# Patient Record
Sex: FEMALE | Race: WHITE | NOT HISPANIC OR LATINO | Employment: OTHER | URBAN - METROPOLITAN AREA
[De-identification: names, ages, dates, MRNs, and addresses within clinical notes are randomized per-mention and may not be internally consistent; named-entity substitution may affect disease eponyms.]

---

## 2017-11-28 ENCOUNTER — ESTABLISHED COMPREHENSIVE EXAM (OUTPATIENT)
Dept: URBAN - METROPOLITAN AREA CLINIC 39 | Facility: CLINIC | Age: 67
End: 2017-11-28

## 2017-11-28 VITALS
HEART RATE: 45 BPM | DIASTOLIC BLOOD PRESSURE: 89 MMHG | SYSTOLIC BLOOD PRESSURE: 131 MMHG | HEIGHT: 55 IN | RESPIRATION RATE: 14 BRPM

## 2017-11-28 DIAGNOSIS — H35.50: ICD-10-CM

## 2017-11-28 DIAGNOSIS — H25.812: ICD-10-CM

## 2017-11-28 DIAGNOSIS — H35.373: ICD-10-CM

## 2017-11-28 DIAGNOSIS — H25.811: ICD-10-CM

## 2017-11-28 PROCEDURE — 92004 COMPRE OPH EXAM NEW PT 1/>: CPT

## 2017-11-28 PROCEDURE — G8428 CUR MEDS NOT DOCUMENT: HCPCS

## 2017-11-28 PROCEDURE — 1036F TOBACCO NON-USER: CPT

## 2017-11-28 PROCEDURE — 92015 DETERMINE REFRACTIVE STATE: CPT

## 2017-11-28 PROCEDURE — 92134 CPTRZ OPH DX IMG PST SGM RTA: CPT

## 2017-11-28 PROCEDURE — G8952 PRE-HTN/HTN, NO F/U, NOT GVN: HCPCS

## 2017-11-28 ASSESSMENT — VISUAL ACUITY
OS_GLARE: 20/50
OS_CC: 20/30+1
OD_CC: 20/30-2
OD_GLARE: 20/60
OD_SC: 20/70
OS_CC: J3
OS_SC: J10
OD_SC: J10
OS_SC: 20/40+2
OD_CC: J2

## 2017-11-28 ASSESSMENT — TONOMETRY
OS_IOP_MMHG: 15
OD_IOP_MMHG: 15

## 2018-08-29 ENCOUNTER — CONSULT (OUTPATIENT)
Dept: URBAN - METROPOLITAN AREA CLINIC 39 | Facility: CLINIC | Age: 68
End: 2018-08-29

## 2018-08-29 DIAGNOSIS — H02.832: ICD-10-CM

## 2018-08-29 DIAGNOSIS — H02.834: ICD-10-CM

## 2018-08-29 DIAGNOSIS — H02.835: ICD-10-CM

## 2018-08-29 DIAGNOSIS — H02.831: ICD-10-CM

## 2018-08-29 PROCEDURE — G8482 FLU IMMUNIZE ORDER/ADMIN: HCPCS

## 2018-08-29 PROCEDURE — 99213 OFFICE O/P EST LOW 20 MIN: CPT

## 2018-08-29 PROCEDURE — G8428 CUR MEDS NOT DOCUMENT: HCPCS

## 2018-08-29 PROCEDURE — G9903 PT SCRN TBCO ID AS NON USER: HCPCS

## 2018-08-29 PROCEDURE — G8785 BP SCRN NO PERF AT INTERVAL: HCPCS

## 2018-08-29 PROCEDURE — 4040F PNEUMOC VAC/ADMIN/RCVD: CPT

## 2018-08-29 PROCEDURE — 1036F TOBACCO NON-USER: CPT

## 2018-08-29 PROCEDURE — 92285 EXTERNAL OCULAR PHOTOGRAPHY: CPT

## 2018-08-29 ASSESSMENT — VISUAL ACUITY
OS_CC: 20/20-2
OD_CC: 20/25-2

## 2019-01-23 NOTE — PATIENT DISCUSSION
ASTIGMATISM OU: Educated patient about diagnosis. New spectacle prescription given to patient. Will continue to monitor yearly.

## 2019-01-23 NOTE — PATIENT DISCUSSION
DERMATOCHALASIS BUL: Patient not bothered by uppper lids. Instructed patient to return to clinic if lids become bothersome or affecting vision. Will continue to monitor.

## 2019-07-23 ENCOUNTER — ESTABLISHED COMPREHENSIVE EXAM (OUTPATIENT)
Dept: URBAN - METROPOLITAN AREA CLINIC 39 | Facility: CLINIC | Age: 69
End: 2019-07-23

## 2019-07-23 DIAGNOSIS — H25.811: ICD-10-CM

## 2019-07-23 DIAGNOSIS — H43.823: ICD-10-CM

## 2019-07-23 DIAGNOSIS — H25.812: ICD-10-CM

## 2019-07-23 PROCEDURE — 92134 CPTRZ OPH DX IMG PST SGM RTA: CPT

## 2019-07-23 PROCEDURE — 92015 DETERMINE REFRACTIVE STATE: CPT

## 2019-07-23 PROCEDURE — 92025NC COMP. CORNEAL TOPO, UNI OR BILAT,

## 2019-07-23 PROCEDURE — 92014 COMPRE OPH EXAM EST PT 1/>: CPT

## 2019-07-23 ASSESSMENT — VISUAL ACUITY
OS_CC: 20/25
OS_SC: J8
OD_SC: 20/40-2
OD_SC: J8
OS_CC: J3
OD_CC: 20/30+2
OD_CC: J1
OS_SC: 20/40

## 2019-07-23 ASSESSMENT — TONOMETRY
OS_IOP_MMHG: 14
OD_IOP_MMHG: 16

## 2019-08-20 ENCOUNTER — RETINA CONSULT (OUTPATIENT)
Dept: URBAN - METROPOLITAN AREA CLINIC 39 | Facility: CLINIC | Age: 69
End: 2019-08-20

## 2019-08-20 VITALS — DIASTOLIC BLOOD PRESSURE: 79 MMHG | SYSTOLIC BLOOD PRESSURE: 139 MMHG | HEIGHT: 55 IN | HEART RATE: 46 BPM

## 2019-08-20 DIAGNOSIS — H35.363: ICD-10-CM

## 2019-08-20 DIAGNOSIS — H43.822: ICD-10-CM

## 2019-08-20 DIAGNOSIS — H35.371: ICD-10-CM

## 2019-08-20 DIAGNOSIS — H43.821: ICD-10-CM

## 2019-08-20 DIAGNOSIS — H35.372: ICD-10-CM

## 2019-08-20 PROCEDURE — 9222550 BILAT EXTENDED OPHTHALMOSCOPY, FIRST

## 2019-08-20 PROCEDURE — 92014 COMPRE OPH EXAM EST PT 1/>: CPT

## 2019-08-20 PROCEDURE — 92134 CPTRZ OPH DX IMG PST SGM RTA: CPT

## 2019-08-20 ASSESSMENT — VISUAL ACUITY
OD_CC: 20/40+2
OS_CC: 20/25-1
OS_CC: J6
OD_CC: J2

## 2019-08-20 ASSESSMENT — TONOMETRY
OD_IOP_MMHG: 14
OS_IOP_MMHG: 14

## 2019-10-22 ENCOUNTER — ESTABLISHED PATIENT (OUTPATIENT)
Dept: URBAN - METROPOLITAN AREA CLINIC 39 | Facility: CLINIC | Age: 69
End: 2019-10-22

## 2019-10-22 DIAGNOSIS — H43.822: ICD-10-CM

## 2019-10-22 DIAGNOSIS — H35.371: ICD-10-CM

## 2019-10-22 DIAGNOSIS — H35.372: ICD-10-CM

## 2019-10-22 DIAGNOSIS — H43.821: ICD-10-CM

## 2019-10-22 DIAGNOSIS — H35.363: ICD-10-CM

## 2019-10-22 PROCEDURE — 9222650 BILAT EXTENDED OPHTHALMOSCOPY, F/U

## 2019-10-22 PROCEDURE — 92012 INTRM OPH EXAM EST PATIENT: CPT

## 2019-10-22 PROCEDURE — 92134 CPTRZ OPH DX IMG PST SGM RTA: CPT

## 2019-10-22 ASSESSMENT — VISUAL ACUITY
OD_CC: 20/30+1
OS_CC: 20/30+1

## 2019-10-22 ASSESSMENT — TONOMETRY
OD_IOP_MMHG: 14
OS_IOP_MMHG: 14

## 2021-06-17 ENCOUNTER — ESTABLISHED COMPREHENSIVE EXAM (OUTPATIENT)
Dept: URBAN - METROPOLITAN AREA CLINIC 38 | Facility: CLINIC | Age: 71
End: 2021-06-17

## 2021-06-17 DIAGNOSIS — H52.13: ICD-10-CM

## 2021-06-17 DIAGNOSIS — H25.811: ICD-10-CM

## 2021-06-17 DIAGNOSIS — H52.203: ICD-10-CM

## 2021-06-17 DIAGNOSIS — H52.4: ICD-10-CM

## 2021-06-17 DIAGNOSIS — H43.823: ICD-10-CM

## 2021-06-17 DIAGNOSIS — H35.363: ICD-10-CM

## 2021-06-17 DIAGNOSIS — H35.50: ICD-10-CM

## 2021-06-17 DIAGNOSIS — H35.373: ICD-10-CM

## 2021-06-17 DIAGNOSIS — H25.812: ICD-10-CM

## 2021-06-17 PROCEDURE — 92134 CPTRZ OPH DX IMG PST SGM RTA: CPT

## 2021-06-17 PROCEDURE — 92015 DETERMINE REFRACTIVE STATE: CPT

## 2021-06-17 PROCEDURE — 92014 COMPRE OPH EXAM EST PT 1/>: CPT

## 2021-06-17 RX ORDER — BRIMONIDINE TARTRATE 0.25 MG/ML: 1 SOLUTION/ DROPS OPHTHALMIC ONCE A DAY

## 2021-06-17 ASSESSMENT — VISUAL ACUITY
OD_CC: 20/30
OS_CC: J3
OD_CC: J1
OS_CC: 20/20

## 2021-06-17 ASSESSMENT — TONOMETRY
OS_IOP_MMHG: 14
OD_IOP_MMHG: 16

## 2022-06-22 ENCOUNTER — COMPREHENSIVE EXAM (OUTPATIENT)
Dept: URBAN - METROPOLITAN AREA CLINIC 38 | Facility: CLINIC | Age: 72
End: 2022-06-22

## 2022-06-22 DIAGNOSIS — H25.813: ICD-10-CM

## 2022-06-22 DIAGNOSIS — H35.372: ICD-10-CM

## 2022-06-22 DIAGNOSIS — H43.822: ICD-10-CM

## 2022-06-22 DIAGNOSIS — H35.363: ICD-10-CM

## 2022-06-22 DIAGNOSIS — H35.50: ICD-10-CM

## 2022-06-22 PROCEDURE — 92015 DETERMINE REFRACTIVE STATE: CPT

## 2022-06-22 PROCEDURE — 92014 COMPRE OPH EXAM EST PT 1/>: CPT

## 2022-06-22 PROCEDURE — 92134 CPTRZ OPH DX IMG PST SGM RTA: CPT

## 2022-06-22 ASSESSMENT — VISUAL ACUITY
OD_CC: 20/25
OD_CC: J2
OU_CC: 20/20
OS_CC: J3
OU_CC: J2
OS_CC: 20/25

## 2022-06-22 ASSESSMENT — TONOMETRY
OD_IOP_MMHG: 15
OS_IOP_MMHG: 14

## 2023-06-26 ENCOUNTER — COMPREHENSIVE EXAM (OUTPATIENT)
Dept: URBAN - METROPOLITAN AREA CLINIC 38 | Facility: CLINIC | Age: 73
End: 2023-06-26

## 2023-06-26 DIAGNOSIS — H25.813: ICD-10-CM

## 2023-06-26 DIAGNOSIS — H52.13: ICD-10-CM

## 2023-06-26 DIAGNOSIS — H35.372: ICD-10-CM

## 2023-06-26 DIAGNOSIS — H35.50: ICD-10-CM

## 2023-06-26 DIAGNOSIS — H35.363: ICD-10-CM

## 2023-06-26 DIAGNOSIS — H52.4: ICD-10-CM

## 2023-06-26 DIAGNOSIS — H52.203: ICD-10-CM

## 2023-06-26 PROCEDURE — 92014 COMPRE OPH EXAM EST PT 1/>: CPT

## 2023-06-26 PROCEDURE — 92015 DETERMINE REFRACTIVE STATE: CPT

## 2023-06-26 PROCEDURE — 92134 CPTRZ OPH DX IMG PST SGM RTA: CPT

## 2023-06-26 ASSESSMENT — VISUAL ACUITY
OU_CC: J2
OS_CC: J2
OU_CC: 20/20
OD_CC: J2
OD_CC: 20/20
OS_CC: 20/20

## 2023-06-26 ASSESSMENT — TONOMETRY
OS_IOP_MMHG: 15
OD_IOP_MMHG: 14

## 2024-06-27 ENCOUNTER — COMPREHENSIVE EXAM (OUTPATIENT)
Dept: URBAN - METROPOLITAN AREA CLINIC 38 | Facility: CLINIC | Age: 74
End: 2024-06-27

## 2024-06-27 DIAGNOSIS — H52.4: ICD-10-CM

## 2024-06-27 DIAGNOSIS — H25.813: ICD-10-CM

## 2024-06-27 DIAGNOSIS — H35.50: ICD-10-CM

## 2024-06-27 DIAGNOSIS — H52.203: ICD-10-CM

## 2024-06-27 DIAGNOSIS — H35.372: ICD-10-CM

## 2024-06-27 DIAGNOSIS — H35.363: ICD-10-CM

## 2024-06-27 DIAGNOSIS — H52.13: ICD-10-CM

## 2024-06-27 PROCEDURE — 92134 CPTRZ OPH DX IMG PST SGM RTA: CPT

## 2024-06-27 PROCEDURE — 92015 DETERMINE REFRACTIVE STATE: CPT

## 2024-06-27 PROCEDURE — 92014 COMPRE OPH EXAM EST PT 1/>: CPT

## 2024-06-27 ASSESSMENT — TONOMETRY
OD_IOP_MMHG: 15
OS_IOP_MMHG: 14

## 2024-06-27 ASSESSMENT — VISUAL ACUITY
OS_CC: J3
OU_CC: J2
OD_CC: J2
OS_CC: 20/20-1
OU_CC: 20/20-
OD_CC: 20/25-

## 2025-06-24 ENCOUNTER — COMPREHENSIVE EXAM (OUTPATIENT)
Age: 75
End: 2025-06-24

## 2025-06-24 DIAGNOSIS — H43.813: ICD-10-CM

## 2025-06-24 DIAGNOSIS — H35.50: ICD-10-CM

## 2025-06-24 DIAGNOSIS — H35.372: ICD-10-CM

## 2025-06-24 DIAGNOSIS — H25.813: ICD-10-CM

## 2025-06-24 DIAGNOSIS — H52.203: ICD-10-CM

## 2025-06-24 DIAGNOSIS — H52.13: ICD-10-CM

## 2025-06-24 DIAGNOSIS — H52.4: ICD-10-CM

## 2025-06-24 DIAGNOSIS — H35.363: ICD-10-CM

## 2025-06-24 PROCEDURE — 92015 DETERMINE REFRACTIVE STATE: CPT

## 2025-06-24 PROCEDURE — 92014 COMPRE OPH EXAM EST PT 1/>: CPT
